# Patient Record
Sex: FEMALE | Race: BLACK OR AFRICAN AMERICAN | Employment: UNEMPLOYED | URBAN - METROPOLITAN AREA
[De-identification: names, ages, dates, MRNs, and addresses within clinical notes are randomized per-mention and may not be internally consistent; named-entity substitution may affect disease eponyms.]

---

## 2021-01-07 ENCOUNTER — HOSPITAL ENCOUNTER (EMERGENCY)
Age: 36
Discharge: HOME | End: 2021-01-07
Attending: STUDENT IN AN ORGANIZED HEALTH CARE EDUCATION/TRAINING PROGRAM

## 2021-01-07 VITALS
HEART RATE: 90 BPM | WEIGHT: 155 LBS | DIASTOLIC BLOOD PRESSURE: 89 MMHG | HEIGHT: 60 IN | TEMPERATURE: 98.6 F | SYSTOLIC BLOOD PRESSURE: 162 MMHG | OXYGEN SATURATION: 100 % | RESPIRATION RATE: 18 BRPM | BODY MASS INDEX: 30.43 KG/M2

## 2021-01-07 DIAGNOSIS — J01.10 ACUTE NON-RECURRENT FRONTAL SINUSITIS: Primary | ICD-10-CM

## 2021-01-07 PROCEDURE — 99283 EMERGENCY DEPT VISIT LOW MDM: CPT

## 2021-01-07 RX ORDER — AZITHROMYCIN 250 MG/1
TABLET, FILM COATED ORAL
Qty: 6 TAB | Refills: 0 | Status: SHIPPED | OUTPATIENT
Start: 2021-01-07

## 2021-01-07 NOTE — ED TRIAGE NOTES
Patient arrives from home mask in place, c/o x 2 days congestion and HA.  Patient states nasal drainage and some cough this AM.

## 2021-01-07 NOTE — LETTER
Castro Coleman was seen and treated in our emergency department on 1/7/2021. She may return to work on January 9, 2021 If you have any questions or concerns, please don't hesitate to call.

## 2021-01-07 NOTE — Clinical Note
[facility] EMERGENCY DEPT 
ONE  [address] [facility] [address] [facility] [address] 
[phone] Work/School Note Date: 1/7/2021 To Whom It May concern: 
 
Johnathon Jang was seen and treated today in the emergency room by the following provider(s): 
Attending Provider: Matt Miner Physician Assistant: CONRADO Vega. Johnathon Jang is excused from work/school on 1/7/2021 through 1/10/2021. She is medically clear to return to work/school on 1/11/2021. Sincerely, CONRADO Nugent

## 2021-01-07 NOTE — ED PROVIDER NOTES
Patient presents to the emergency department for sinus congestion and pain with a headache. States this feels like her previous sinus infections. She noticed she started having some drainage in her nasal passages and then, when she woke up this morning she had some of the back of her throat. Quite get it out. She denies fever body aches or chills. Was tachycardic in triage at 130 then came down to 114 in vertical care. Patient states that she is very nervous person and time she comes to the hospital her heart rate goes really high. The history is provided by the patient. Sinus Pain   This is a new problem. The current episode started 2 days ago. There has been no fever. The pain is at a severity of 4/10. The pain is mild. The pain has been constant since onset. Pertinent negatives include no chills, no sweats, no congestion, no sinus pressure, no sore throat, no swollen glands, no cough and no shortness of breath. She has tried nothing for the symptoms. No past medical history on file. No past surgical history on file. No family history on file.     Social History     Socioeconomic History    Marital status: SINGLE     Spouse name: Not on file    Number of children: Not on file    Years of education: Not on file    Highest education level: Not on file   Occupational History    Not on file   Social Needs    Financial resource strain: Not on file    Food insecurity     Worry: Not on file     Inability: Not on file    Transportation needs     Medical: Not on file     Non-medical: Not on file   Tobacco Use    Smoking status: Never Smoker   Substance and Sexual Activity    Alcohol use: No    Drug use: No    Sexual activity: Never     Birth control/protection: None   Lifestyle    Physical activity     Days per week: Not on file     Minutes per session: Not on file    Stress: Not on file   Relationships    Social connections     Talks on phone: Not on file     Gets together: Not on file     Attends Hinduism service: Not on file     Active member of club or organization: Not on file     Attends meetings of clubs or organizations: Not on file     Relationship status: Not on file    Intimate partner violence     Fear of current or ex partner: Not on file     Emotionally abused: Not on file     Physically abused: Not on file     Forced sexual activity: Not on file   Other Topics Concern    Not on file   Social History Narrative    Not on file         ALLERGIES: Patient has no known allergies. Review of Systems   Constitutional: Negative. Negative for activity change, appetite change, chills and fever. HENT: Positive for sinus pain. Negative for congestion, sinus pressure and sore throat. Respiratory: Negative. Negative for cough and shortness of breath. Cardiovascular: Negative. Gastrointestinal: Negative. Genitourinary: Negative. Musculoskeletal: Negative. Negative for gait problem. Neurological: Negative. All other systems reviewed and are negative. Vitals:    01/07/21 1117 01/07/21 1122   BP: (!) 162/89    Pulse: (!) 124    Resp: 18    Temp: 98.6 °F (37 °C)    SpO2: 100% 100%   Weight: 70.3 kg (155 lb)    Height: 5' (1.524 m)             Physical Exam  Vitals signs and nursing note reviewed. Constitutional:       General: She is not in acute distress. Appearance: Normal appearance. She is not ill-appearing, toxic-appearing or diaphoretic. HENT:      Head: Normocephalic and atraumatic. Comments: Bending head forward causes pain in the her forehead and the frontal sinuses. Right worse than left     Right Ear: Hearing, tympanic membrane, ear canal and external ear normal.      Left Ear: Hearing, tympanic membrane, ear canal and external ear normal.      Nose: Nose normal.      Right Sinus: No maxillary sinus tenderness or frontal sinus tenderness. Left Sinus: No frontal sinus tenderness. Mouth/Throat:      Lips: Pink.       Mouth: Mucous membranes are moist.      Tongue: No lesions. Palate: No mass. Pharynx: No pharyngeal swelling, oropharyngeal exudate, posterior oropharyngeal erythema or uvula swelling. Eyes:      General: Lids are normal. Gaze aligned appropriately. Conjunctiva/sclera: Conjunctivae normal.   Cardiovascular:      Rate and Rhythm: Normal rate and regular rhythm. Pulses: Normal pulses. Heart sounds: Normal heart sounds. Pulmonary:      Effort: Pulmonary effort is normal. No respiratory distress. Breath sounds: Normal breath sounds. No stridor. No wheezing, rhonchi or rales. Chest:      Chest wall: No tenderness. Skin:     General: Skin is warm and dry. Neurological:      General: No focal deficit present. Mental Status: She is alert and oriented to person, place, and time. Mental status is at baseline. MDM  Number of Diagnoses or Management Options  Diagnosis management comments: Patient presents emergency department with sinus pain and congestion most likely a sinus infection. Patient was tachycardic although he later sit and lay down on the gurney for a while and she her heart rate got down to 88. As soon as she would see me coming to the room her heart rate would go back up. Asked the patient multiple times if she would allow us to do an EKG and she declined. She states that there is nothing wrong with her heart this is what she does. I explained to her the risks of not knowing exactly what is wrong with her if there is something. She states she is fine she just wants antibiotics and go home. She will return if there is any worsening symptoms. Denies any shortness of breath or dizziness she denies any difficulty breathing.        Amount and/or Complexity of Data Reviewed  Discuss the patient with other providers: yes    Risk of Complications, Morbidity, and/or Mortality  Presenting problems: moderate  Diagnostic procedures: low  Management options: low    Patient Progress  Patient progress: stable         Procedures

## 2022-10-20 ENCOUNTER — HOSPITAL ENCOUNTER (EMERGENCY)
Age: 37
Discharge: HOME | End: 2022-10-20
Attending: EMERGENCY MEDICINE

## 2022-10-20 VITALS
HEART RATE: 120 BPM | SYSTOLIC BLOOD PRESSURE: 160 MMHG | RESPIRATION RATE: 18 BRPM | OXYGEN SATURATION: 100 % | DIASTOLIC BLOOD PRESSURE: 95 MMHG | TEMPERATURE: 98.8 F

## 2022-10-20 DIAGNOSIS — J01.00 ACUTE MAXILLARY SINUSITIS, RECURRENCE NOT SPECIFIED: Primary | ICD-10-CM

## 2022-10-20 PROCEDURE — 99283 EMERGENCY DEPT VISIT LOW MDM: CPT | Performed by: EMERGENCY MEDICINE

## 2022-10-20 PROCEDURE — 99283 EMERGENCY DEPT VISIT LOW MDM: CPT | Performed by: PHYSICIAN ASSISTANT

## 2022-10-20 RX ORDER — AMOXICILLIN AND CLAVULANATE POTASSIUM 875; 125 MG/1; MG/1
1 TABLET, FILM COATED ORAL 2 TIMES DAILY
Qty: 28 TABLET | Refills: 0 | Status: SHIPPED | OUTPATIENT
Start: 2022-10-20 | End: 2022-11-03

## 2022-10-20 RX ORDER — METHYLPREDNISOLONE 4 MG/1
4 TABLET ORAL SEE ADMIN INSTRUCTIONS
Qty: 1 KIT | Refills: 0 | Status: SHIPPED | OUTPATIENT
Start: 2022-10-20

## 2022-10-20 ASSESSMENT — ENCOUNTER SYMPTOMS
COUGH: 0
SINUS PRESSURE: 1
SHORTNESS OF BREATH: 0
RHINORRHEA: 1
SORE THROAT: 0
GASTROINTESTINAL NEGATIVE: 1
ALLERGIC/IMMUNOLOGIC NEGATIVE: 1
WHEEZING: 0
RESPIRATORY NEGATIVE: 1
EYES NEGATIVE: 1
SNORING: 0
NAUSEA: 0
SINUS PAIN: 0
VOMITING: 0
SWOLLEN GLANDS: 0
HOARSE VOICE: 0

## 2022-10-20 NOTE — ED NOTES
I have reviewed discharge instructions with the patient. The patient verbalized understanding. Patient left ED via Discharge Method: ambulatory to Home. Opportunity for questions and clarification provided. Patient given 1 scripts. To continue your aftercare when you leave the hospital, you may receive an automated call from our care team to check in on how you are doing. This is a free service and part of our promise to provide the best care and service to meet your aftercare needs.  If you have questions, or wish to unsubscribe from this service please call [phone]. Thank you for Choosing our [facility] Emergency Department.         Bharathi Cheung RN  10/20/22 9227

## 2022-10-20 NOTE — DISCHARGE INSTRUCTIONS
Drink plenty of fluids, rest, finish all of the Medrol Dosepak and antibiotics if you decide to take them if the Medrol Dosepak is not working. You can try Juliet pot. Run a humidifier. Use over-the-counter Tylenol as directed if needed for pain. Return to the ED if worsening in any way. We would love to help you get a primary care doctor for follow-up after your emergency department visit. Please call [phone] between 7AM - 6PM Monday to Friday. A care navigator will be able to assist you with setting up a doctor close to your home.

## 2022-10-20 NOTE — ED PROVIDER NOTES
history on file. No past surgical history on file. No family history on file. Social History     Socioeconomic History    Marital status: Single   Tobacco Use    Smoking status: Never   Substance and Sexual Activity    Alcohol use: No    Drug use: No         Patient has no known allergies. Discharge Medication List as of 10/20/2022 11:33 AM        CONTINUE these medications which have NOT CHANGED    Details   azithromycin (ZITHROMAX) 250 MG tablet Take 2 tabs on day 1 then 1 tab days 2 through 5. Historical Med              Vitals signs and nursing note reviewed. Patient Vitals for the past 4 hrs:   Temp Pulse Resp BP SpO2   10/20/22 1120 98.8 °F (37.1 °C) (!) 120 18 (!) 160/95 100 %          Physical Exam  Vitals and nursing note reviewed. Constitutional:       Appearance: Normal appearance. HENT:      Head: Normocephalic and atraumatic. Jaw: There is normal jaw occlusion. Right Ear: Tympanic membrane, ear canal and external ear normal.      Left Ear: Tympanic membrane, ear canal and external ear normal.      Nose:      Right Turbinates: Swollen. Left Turbinates: Swollen. Right Sinus: Maxillary sinus tenderness present. Left Sinus: No maxillary sinus tenderness. Mouth/Throat:      Mouth: Mucous membranes are moist.   Eyes:      Extraocular Movements: Extraocular movements intact. Conjunctiva/sclera: Conjunctivae normal.      Pupils: Pupils are equal, round, and reactive to light. Neck:      Vascular: No carotid bruit. Cardiovascular:      Rate and Rhythm: Normal rate. Pulses: Normal pulses. Pulmonary:      Effort: Pulmonary effort is normal.   Abdominal:      General: Abdomen is flat. Palpations: Abdomen is soft. Musculoskeletal:         General: Normal range of motion. Cervical back: Normal range of motion. No rigidity or tenderness. Lymphadenopathy:      Cervical: No cervical adenopathy. Skin:     General: Skin is warm. Capillary Refill: Capillary refill takes less than 2 seconds. Neurological:      General: No focal deficit present. Mental Status: She is alert and oriented to person, place, and time. Mental status is at baseline. Psychiatric:         Mood and Affect: Mood normal.         Behavior: Behavior normal.         Thought Content: Thought content normal.         Judgment: Judgment normal.        Procedures    No results found for any visits on 10/20/22. No orders to display                       Voice dictation software was used during the making of this note. This software is not perfect and grammatical and other typographical errors may be present. This note has not been completely proofread for errors.      VÍCTOR Reyes  10/20/22 3694

## 2022-10-20 NOTE — ED TRIAGE NOTES
Pt presents to triage ambulatory with mask on, A&Ox4. Pt reports congestion and sinus pressure x1 week.

## 2022-12-24 ENCOUNTER — HOSPITAL ENCOUNTER (EMERGENCY)
Age: 37
Discharge: HOME | End: 2022-12-24
Attending: EMERGENCY MEDICINE

## 2022-12-24 VITALS
BODY MASS INDEX: 26.9 KG/M2 | DIASTOLIC BLOOD PRESSURE: 91 MMHG | HEIGHT: 60 IN | TEMPERATURE: 98 F | SYSTOLIC BLOOD PRESSURE: 177 MMHG | HEART RATE: 107 BPM | RESPIRATION RATE: 16 BRPM | WEIGHT: 137 LBS | OXYGEN SATURATION: 100 %

## 2022-12-24 DIAGNOSIS — K08.89 PAIN, DENTAL: Primary | ICD-10-CM

## 2022-12-24 PROCEDURE — 99283 EMERGENCY DEPT VISIT LOW MDM: CPT

## 2022-12-24 RX ORDER — PENICILLIN V POTASSIUM 500 MG/1
500 TABLET ORAL 2 TIMES DAILY
Qty: 20 TABLET | Refills: 0 | Status: SHIPPED | OUTPATIENT
Start: 2022-12-24 | End: 2023-01-03

## 2022-12-24 RX ORDER — IBUPROFEN 800 MG/1
800 TABLET ORAL EVERY 8 HOURS PRN
Qty: 30 TABLET | Refills: 0 | Status: SHIPPED | OUTPATIENT
Start: 2022-12-24

## 2022-12-24 RX ORDER — IBUPROFEN 800 MG/1
800 TABLET ORAL EVERY 8 HOURS PRN
Qty: 30 TABLET | Refills: 0 | Status: SHIPPED | OUTPATIENT
Start: 2022-12-24 | End: 2022-12-24 | Stop reason: SDUPTHER

## 2022-12-24 RX ORDER — PENICILLIN V POTASSIUM 500 MG/1
500 TABLET ORAL 2 TIMES DAILY
Qty: 20 TABLET | Refills: 0 | Status: SHIPPED | OUTPATIENT
Start: 2022-12-24 | End: 2022-12-24 | Stop reason: SDUPTHER

## 2022-12-24 ASSESSMENT — PAIN - FUNCTIONAL ASSESSMENT: PAIN_FUNCTIONAL_ASSESSMENT: 0-10

## 2022-12-24 ASSESSMENT — PAIN SCALES - GENERAL: PAINLEVEL_OUTOF10: 7

## 2022-12-24 ASSESSMENT — ENCOUNTER SYMPTOMS
TRISMUS: 0
FACIAL SWELLING: 1

## 2022-12-24 ASSESSMENT — PAIN DESCRIPTION - LOCATION
LOCATION: TEETH
LOCATION: MOUTH

## 2022-12-24 ASSESSMENT — PAIN DESCRIPTION - ORIENTATION
ORIENTATION: RIGHT;UPPER
ORIENTATION: RIGHT;UPPER

## 2022-12-24 ASSESSMENT — PAIN DESCRIPTION - DESCRIPTORS: DESCRIPTORS: ACHING;DISCOMFORT

## 2022-12-24 NOTE — DISCHARGE INSTRUCTIONS
Take medication as prescribed. Heat may also be helpful to alleviate pain. Applying a warm cloth, heating pad, or warm water bottle for 10 minutes at a time several times a day may help alleviate pain. As we discussed, often times dental pain is caused by an abscessed tooth. Please schedule an appointment with a dentist as soon as possible. Return to the emergency department for any new, worsening, or concerning symptoms.

## 2022-12-24 NOTE — ED NOTES
I have reviewed discharge instructions with the patient. The patient verbalized understanding. Patient left ED via Discharge Method: ambulatory to Home with self. Opportunity for questions and clarification provided. Patient given 2 scripts. To continue your aftercare when you leave the hospital, you may receive an automated call from our care team to check in on how you are doing. This is a free service and part of our promise to provide the best care and service to meet your aftercare needs.  If you have questions, or wish to unsubscribe from this service please call [phone]. Thank you for Choosing our [facility] Emergency Department.         Pavel Ruiz RN  12/24/22 1437

## 2022-12-24 NOTE — ED PROVIDER NOTES
Emergency Department Provider Note                   PCP:                No primary care provider on file. Age: 42 y.o. Sex: female       ICD-10-CM    1. Pain, dental  K08.89           DISPOSITION Decision To Discharge 12/24/2022 11:04:37 AM        MDM  Number of Diagnoses or Management Options  Pain, dental: minor  Diagnosis management comments: Otherwise healthy 64-year-old female who presents to the emergency department today with complaint of right upper dental pain. Patient appears in no acute distress. She has some dental tenderness on exam but otherwise exam unremarkable. No angioedema. She is speaking in full sentences and tolerating oral secretions without difficulty. No drainable abscesses noted on exam.  Will cover for possible abscess with penicillin. Red flag symptoms and return precautions discussed. Encouraged follow-up with a dentist.  Patient verbalized understanding agreement with instructions, treatment plan, discharge. Risk of Complications, Morbidity, and/or Mortality  Presenting problems: low  Diagnostic procedures: low  Management options: low    Patient Progress  Patient progress: improved     No orders of the defined types were placed in this encounter. Medications - No data to display    Discharge Medication List as of 12/24/2022 11:05 AM           Abdelrahman Hart is a 42 y.o. female who presents to the Emergency Department with chief complaint of    Chief Complaint   Patient presents with    Dental Pain      Otherwise healthy 64-year-old female who presents to the emergency department today with complaint of right upper dental pain. Patient states that symptoms began on Thursday. When symptoms first began, she had some swelling to the right side of her face. She has tried taking Tylenol and Motrin without much relief. She reports the pain has persisted.   She does not currently have a dentist.  She denies any difficulty swallowing, shortness of breath, fever, chills, chest pain, abdominal pain, or other complaints today. The history is provided by the patient. Dental Pain  Location:  Upper  Quality:  Aching  Severity:  Mild  Onset quality:  Gradual  Duration:  3 days  Timing:  Constant  Progression:  Unchanged  Chronicity:  New  Relieved by:  Nothing  Worsened by:  Pressure  Ineffective treatments:  NSAIDs and acetaminophen  Associated symptoms: facial pain, facial swelling and gum swelling    Associated symptoms: no congestion, no difficulty swallowing, no fever, no headaches, no neck pain, no neck swelling and no trismus        Review of Systems   Constitutional:  Negative for fever. HENT:  Positive for dental problem and facial swelling. Negative for congestion. Musculoskeletal:  Negative for neck pain. Neurological:  Negative for headaches. All other systems reviewed and are negative. History reviewed. No pertinent past medical history. History reviewed. No pertinent surgical history. History reviewed. No pertinent family history. Social History     Socioeconomic History    Marital status: Single     Spouse name: None    Number of children: None    Years of education: None    Highest education level: None   Tobacco Use    Smoking status: Never   Substance and Sexual Activity    Alcohol use: No    Drug use: No         Patient has no known allergies. Discharge Medication List as of 12/24/2022 11:05 AM        CONTINUE these medications which have NOT CHANGED    Details   methylPREDNISolone (MEDROL, AROLDO,) 4 MG tablet Take 1 tablet by mouth See Admin Instructions Take by mouth., Disp-1 kit, R-0Normal      azithromycin (ZITHROMAX) 250 MG tablet Take 2 tabs on day 1 then 1 tab days 2 through 5. Historical Med              Vitals signs and nursing note reviewed.    Patient Vitals for the past 4 hrs:   Temp Pulse Resp BP SpO2   12/24/22 1057 98 °F (36.7 °C) (!) 107 16 (!) 177/91 100 %          Physical Exam  Vitals and nursing note reviewed. Constitutional:       General: She is not in acute distress. Appearance: Normal appearance. She is well-developed. She is not ill-appearing, toxic-appearing or diaphoretic. HENT:      Head: Normocephalic and atraumatic. Jaw: There is normal jaw occlusion. Tenderness present. No trismus, swelling, pain on movement or malocclusion. Mouth/Throat:      Mouth: Mucous membranes are moist. No angioedema. Dentition: Dental tenderness present. Pharynx: Uvula midline. No uvula swelling. Comments: Right upper dental tenderness. No swelling or abscess noted on exam. No broken or missing teeth. Eyes:      General: No scleral icterus. Extraocular Movements: Extraocular movements intact. Cardiovascular:      Rate and Rhythm: Normal rate. Pulmonary:      Effort: Pulmonary effort is normal. No respiratory distress. Abdominal:      General: Abdomen is flat. There is no distension. Skin:     General: Skin is warm and dry. Neurological:      General: No focal deficit present. Mental Status: She is alert and oriented to person, place, and time. Psychiatric:         Mood and Affect: Mood normal.         Behavior: Behavior normal.        Procedures    No results found for any visits on 12/24/22. No orders to display                       Voice dictation software was used during the making of this note. This software is not perfect and grammatical and other typographical errors may be present. This note has not been completely proofread for errors.        MICHELLE Ariza - [place]  12/24/22 [id number]

## 2023-09-13 ENCOUNTER — APPOINTMENT (OUTPATIENT)
Dept: GENERAL RADIOLOGY | Age: 38
End: 2023-09-13

## 2023-09-13 ENCOUNTER — HOSPITAL ENCOUNTER (EMERGENCY)
Age: 38
Discharge: HOME | End: 2023-09-13
Attending: EMERGENCY MEDICINE

## 2023-09-13 ENCOUNTER — APPOINTMENT (OUTPATIENT)
Dept: CT IMAGING | Age: 38
End: 2023-09-13

## 2023-09-13 VITALS
WEIGHT: 136 LBS | RESPIRATION RATE: 20 BRPM | OXYGEN SATURATION: 100 % | BODY MASS INDEX: 26.7 KG/M2 | TEMPERATURE: 99 F | HEIGHT: 60 IN | SYSTOLIC BLOOD PRESSURE: 141 MMHG | DIASTOLIC BLOOD PRESSURE: 91 MMHG | HEART RATE: 73 BPM

## 2023-09-13 DIAGNOSIS — R19.5 OCCULT BLOOD IN STOOLS: ICD-10-CM

## 2023-09-13 DIAGNOSIS — K59.00 CONSTIPATION, UNSPECIFIED CONSTIPATION TYPE: Primary | ICD-10-CM

## 2023-09-13 LAB
ALBUMIN SERPL-MCNC: 4.2 G/DL (ref 3.5–5)
ALBUMIN/GLOB SERPL: 0.8 (ref 0.4–1.6)
ALP SERPL-CCNC: 56 U/L (ref 50–136)
ALT SERPL-CCNC: 27 U/L (ref 12–65)
ANION GAP SERPL CALC-SCNC: 7 MMOL/L (ref 2–11)
AST SERPL-CCNC: 19 U/L (ref 15–37)
BACTERIA URNS QL MICRO: NEGATIVE /HPF
BASOPHILS # BLD: 0 K/UL (ref 0–0.2)
BASOPHILS NFR BLD: 1 % (ref 0–2)
BILIRUB SERPL-MCNC: 0.8 MG/DL (ref 0.2–1.1)
BILIRUB UR QL: NEGATIVE
BUN SERPL-MCNC: 9 MG/DL (ref 6–23)
CALCIUM SERPL-MCNC: 9.4 MG/DL (ref 8.3–10.4)
CASTS URNS QL MICRO: NORMAL /LPF
CHLORIDE SERPL-SCNC: 108 MMOL/L (ref 101–110)
CO2 SERPL-SCNC: 24 MMOL/L (ref 21–32)
CREAT SERPL-MCNC: 0.9 MG/DL (ref 0.6–1)
DIFFERENTIAL METHOD BLD: ABNORMAL
EOSINOPHIL # BLD: 0 K/UL (ref 0–0.8)
EOSINOPHIL NFR BLD: 0 % (ref 0.5–7.8)
EPI CELLS #/AREA URNS HPF: NORMAL /HPF
ERYTHROCYTE [DISTWIDTH] IN BLOOD BY AUTOMATED COUNT: 15.7 % (ref 11.9–14.6)
GLOBULIN SER CALC-MCNC: 5 G/DL (ref 2.8–4.5)
GLUCOSE SERPL-MCNC: 98 MG/DL (ref 65–100)
GLUCOSE UR QL STRIP.AUTO: NEGATIVE MG/DL
HCG UR QL: NEGATIVE
HCT VFR BLD AUTO: 38.1 % (ref 35.8–46.3)
HGB BLD-MCNC: 11.5 G/DL (ref 11.7–15.4)
IMM GRANULOCYTES # BLD AUTO: 0 K/UL (ref 0–0.5)
IMM GRANULOCYTES NFR BLD AUTO: 0 % (ref 0–5)
KETONES UR-MCNC: 15 MG/DL
LACTATE SERPL-SCNC: 1.7 MMOL/L (ref 0.4–2)
LEUKOCYTE ESTERASE UR QL STRIP: NEGATIVE
LIPASE SERPL-CCNC: 93 U/L (ref 73–393)
LYMPHOCYTES # BLD: 1.5 K/UL (ref 0.5–4.6)
LYMPHOCYTES NFR BLD: 39 % (ref 13–44)
MCH RBC QN AUTO: 22.1 PG (ref 26.1–32.9)
MCHC RBC AUTO-ENTMCNC: 30.2 G/DL (ref 31.4–35)
MCV RBC AUTO: 73.3 FL (ref 82–102)
MONOCYTES # BLD: 0.4 K/UL (ref 0.1–1.3)
MONOCYTES NFR BLD: 9 % (ref 4–12)
NEUTS SEG # BLD: 1.9 K/UL (ref 1.7–8.2)
NEUTS SEG NFR BLD: 50 % (ref 43–78)
NITRITE UR QL: NEGATIVE
NRBC # BLD: 0 K/UL (ref 0–0.2)
PH UR: 5.5 (ref 5–9)
PLATELET # BLD AUTO: 324 K/UL (ref 150–450)
PMV BLD AUTO: ABNORMAL FL (ref 9.4–12.3)
POTASSIUM SERPL-SCNC: 3.6 MMOL/L (ref 3.5–5.1)
PROCALCITONIN SERPL-MCNC: <0.05 NG/ML (ref 0–0.49)
PROT SERPL-MCNC: 9.2 G/DL (ref 6.3–8.2)
PROT UR QL: NEGATIVE MG/DL
RBC # BLD AUTO: 5.2 M/UL (ref 4.05–5.2)
RBC # UR STRIP: NEGATIVE
RBC #/AREA URNS HPF: NORMAL /HPF
SERVICE CMNT-IMP: ABNORMAL
SODIUM SERPL-SCNC: 139 MMOL/L (ref 133–143)
SP GR UR: 1.02 (ref 1–1.02)
UROBILINOGEN UR QL: 1 EU/DL (ref 0.2–1)
WBC # BLD AUTO: 3.7 K/UL (ref 4.3–11.1)
WBC URNS QL MICRO: NORMAL /HPF

## 2023-09-13 PROCEDURE — 6360000002 HC RX W HCPCS

## 2023-09-13 PROCEDURE — 96366 THER/PROPH/DIAG IV INF ADDON: CPT

## 2023-09-13 PROCEDURE — 83605 ASSAY OF LACTIC ACID: CPT

## 2023-09-13 PROCEDURE — 96365 THER/PROPH/DIAG IV INF INIT: CPT

## 2023-09-13 PROCEDURE — 99285 EMERGENCY DEPT VISIT HI MDM: CPT

## 2023-09-13 PROCEDURE — 74177 CT ABD & PELVIS W/CONTRAST: CPT

## 2023-09-13 PROCEDURE — 93005 ELECTROCARDIOGRAM TRACING: CPT

## 2023-09-13 PROCEDURE — 6360000004 HC RX CONTRAST MEDICATION

## 2023-09-13 PROCEDURE — 2580000003 HC RX 258

## 2023-09-13 PROCEDURE — 85025 COMPLETE CBC W/AUTO DIFF WBC: CPT

## 2023-09-13 PROCEDURE — 81025 URINE PREGNANCY TEST: CPT

## 2023-09-13 PROCEDURE — 81003 URINALYSIS AUTO W/O SCOPE: CPT

## 2023-09-13 PROCEDURE — 81015 MICROSCOPIC EXAM OF URINE: CPT

## 2023-09-13 PROCEDURE — 96361 HYDRATE IV INFUSION ADD-ON: CPT

## 2023-09-13 PROCEDURE — 80053 COMPREHEN METABOLIC PANEL: CPT

## 2023-09-13 PROCEDURE — 87040 BLOOD CULTURE FOR BACTERIA: CPT

## 2023-09-13 PROCEDURE — 84145 PROCALCITONIN (PCT): CPT

## 2023-09-13 PROCEDURE — 83690 ASSAY OF LIPASE: CPT

## 2023-09-13 PROCEDURE — 71045 X-RAY EXAM CHEST 1 VIEW: CPT

## 2023-09-13 RX ORDER — POLYETHYLENE GLYCOL 3350 17 G/17G
17 POWDER, FOR SOLUTION ORAL DAILY
Qty: 1530 G | Refills: 0 | Status: SHIPPED | OUTPATIENT
Start: 2023-09-13 | End: 2023-12-12

## 2023-09-13 RX ORDER — BISACODYL 5 MG/1
5 TABLET, DELAYED RELEASE ORAL DAILY PRN
Qty: 5 TABLET | Refills: 0 | Status: SHIPPED | OUTPATIENT
Start: 2023-09-13

## 2023-09-13 RX ORDER — ENEMA 19; 7 G/133ML; G/133ML
1 ENEMA RECTAL
Qty: 3 EACH | Refills: 0 | Status: SHIPPED | OUTPATIENT
Start: 2023-09-13 | End: 2023-09-13

## 2023-09-13 RX ADMIN — PIPERACILLIN AND TAZOBACTAM 3375 MG: 3; .375 INJECTION, POWDER, LYOPHILIZED, FOR SOLUTION INTRAVENOUS at 14:44

## 2023-09-13 RX ADMIN — IOPAMIDOL 100 ML: 755 INJECTION, SOLUTION INTRAVENOUS at 13:50

## 2023-09-13 RX ADMIN — SODIUM CHLORIDE 1365 ML: 9 INJECTION, SOLUTION INTRAVENOUS at 14:45

## 2023-09-13 ASSESSMENT — LIFESTYLE VARIABLES
HOW MANY STANDARD DRINKS CONTAINING ALCOHOL DO YOU HAVE ON A TYPICAL DAY: PATIENT DOES NOT DRINK
HOW OFTEN DO YOU HAVE A DRINK CONTAINING ALCOHOL: NEVER

## 2023-09-13 ASSESSMENT — PAIN - FUNCTIONAL ASSESSMENT: PAIN_FUNCTIONAL_ASSESSMENT: 0-10

## 2023-09-13 ASSESSMENT — ENCOUNTER SYMPTOMS
BLOOD IN STOOL: 1
VOMITING: 0
COUGH: 0
NAUSEA: 0
SHORTNESS OF BREATH: 0
ABDOMINAL PAIN: 1

## 2023-09-13 ASSESSMENT — PAIN SCALES - GENERAL: PAINLEVEL_OUTOF10: 0

## 2023-09-13 NOTE — ED NOTES
Pt refusing second blood culture draw at this time. Risks and benefit associated with second culture discussed and pt acknowledged. Provider notified of pt refusal and okay to proceed with discharge.      Carlos Corcoranr, ROMAN  09/13/23 8717

## 2023-09-13 NOTE — ED TRIAGE NOTES
Patient arrives to ED pov from home. Patient reports left lower abdominal pain x 1 month. Denies n/v/d. Patient reports she feels something \"sticking out\" when she leans forward. Denies urinary pain or burning. Patient reports small amount of blood in her stool.

## 2023-09-13 NOTE — DISCHARGE INSTRUCTIONS
Use the MiraLAX daily. This is not a laxative, it is a stool softener that should be taken each day. Use the Fleet enemas and the laxative Dulcolax only as needed for constipation. Follow-up with gastroenterology due to the blood in your stools. If your symptoms change or worsen, return immediately to the emergency department.

## 2023-09-13 NOTE — ED PROVIDER NOTES
Emergency Department Provider Note       PCP: No primary care provider on file. Age: 42 y.o. Sex: female     DISPOSITION Decision To Discharge 09/13/2023 05:04:27 PM       ICD-10-CM    1. Constipation, unspecified constipation type  K59.00 [address] [facility]      2. Occult blood in stools  R19.5           Medical Decision Making     Complexity of Problems Addressed:  1 or more acute illnesses that pose a threat to life or bodily function. Data Reviewed and Analyzed:   I independently ordered and reviewed each unique test.  Discussion of management or test interpretation. This patient is an otherwise healthy 38-year-old female presents today due to left lower quadrant pain that has been intermittent for the past month or so associated with bright red blood per rectum that has been ongoing for the past couple of days intermittently. Her last bowel movement was yesterday and was not abnormal for her. Physical exam of the patient reveals no peritoneal signs and no significant tenderness to palpation of her abdomen. No masses or hernias appreciated. Her CBC shows a leukopenia with a WBC of 3.7. She meets 2 SIRS criteria due to a recorded heart rate of 96 bpm and therefore sepsis labs were ordered as well as IV fluids and Zosyn. Her lactic acid and procalcitonin are both within normal limits. Her CMP is unremarkable. Urinalysis shows no signs of infection. CT scan of the patient's abdomen and pelvis reveals a large stool burden concerning for possible fecal impaction. On digital rectal exam, no fecal impaction or large stool burden was appreciated. She does have Hemoccult positive stools. I discussed this patient's case with my attending physician, Dr. Bryanna Reaves, who agreed with my evaluation and disposition at this time. Patient was given a referral to gastroenterology and was given several medications to try at home for relieving her stool burden.   All questions

## 2023-09-14 LAB
EKG ATRIAL RATE: 68 BPM
EKG DIAGNOSIS: NORMAL
EKG P AXIS: -11 DEGREES
EKG P-R INTERVAL: 122 MS
EKG Q-T INTERVAL: 406 MS
EKG QRS DURATION: 76 MS
EKG QTC CALCULATION (BAZETT): 431 MS
EKG R AXIS: 82 DEGREES
EKG T AXIS: 52 DEGREES
EKG VENTRICULAR RATE: 68 BPM

## 2023-09-17 LAB
BACTERIA SPEC CULT: NORMAL
SERVICE CMNT-IMP: NORMAL

## 2023-09-18 LAB
BACTERIA SPEC CULT: NORMAL
SERVICE CMNT-IMP: NORMAL

## 2023-12-18 PROBLEM — K08.89 DENTALGIA: Status: ACTIVE | Noted: 2023-12-18

## 2024-03-25 ENCOUNTER — TRANSCRIBE ORDERS (OUTPATIENT)
Dept: SCHEDULING | Age: 39
End: 2024-03-25

## 2024-03-25 DIAGNOSIS — Z12.31 SCREENING MAMMOGRAM FOR HIGH-RISK PATIENT: Primary | ICD-10-CM

## 2024-04-11 ENCOUNTER — HOSPITAL ENCOUNTER (OUTPATIENT)
Dept: MAMMOGRAPHY | Age: 39
Discharge: HOME | End: 2024-04-14

## 2024-04-11 DIAGNOSIS — Z12.31 SCREENING MAMMOGRAM FOR HIGH-RISK PATIENT: ICD-10-CM

## 2024-04-11 PROCEDURE — 77063 BREAST TOMOSYNTHESIS BI: CPT

## 2024-05-15 ENCOUNTER — HOSPITAL ENCOUNTER (OUTPATIENT)
Dept: MAMMOGRAPHY | Age: 39
Discharge: HOME | End: 2024-05-18

## 2024-05-15 DIAGNOSIS — R92.8 ABNORMAL SCREENING MAMMOGRAM: ICD-10-CM

## 2024-05-15 PROCEDURE — G0279 TOMOSYNTHESIS, MAMMO: HCPCS
